# Patient Record
Sex: MALE | Race: BLACK OR AFRICAN AMERICAN
[De-identification: names, ages, dates, MRNs, and addresses within clinical notes are randomized per-mention and may not be internally consistent; named-entity substitution may affect disease eponyms.]

---

## 2017-01-18 ENCOUNTER — HOSPITAL ENCOUNTER (EMERGENCY)
Dept: HOSPITAL 61 - ER | Age: 27
Discharge: HOME | End: 2017-01-18
Payer: SELF-PAY

## 2017-01-18 VITALS — DIASTOLIC BLOOD PRESSURE: 62 MMHG | SYSTOLIC BLOOD PRESSURE: 121 MMHG

## 2017-01-18 VITALS — HEIGHT: 74 IN | BODY MASS INDEX: 26.31 KG/M2 | WEIGHT: 205 LBS

## 2017-01-18 DIAGNOSIS — R05: ICD-10-CM

## 2017-01-18 DIAGNOSIS — F12.10: ICD-10-CM

## 2017-01-18 DIAGNOSIS — M54.6: Primary | ICD-10-CM

## 2017-01-18 LAB
BACTERIA #/AREA URNS HPF: (no result) /HPF
BILIRUB UR QL STRIP: (no result)
GLUCOSE UR STRIP-MCNC: NEGATIVE MG/DL
NITRITE UR QL STRIP: NEGATIVE
PH UR STRIP: 6 [PH]
PROT UR STRIP-MCNC: NEGATIVE MG/DL
RBC #/AREA URNS HPF: (no result) /HPF (ref 0–2)
SP GR UR STRIP: 1.02
SQUAMOUS #/AREA URNS LPF: (no result) /LPF
UROBILINOGEN UR-MCNC: 1 MG/DL
WBC #/AREA URNS HPF: (no result) /HPF (ref 0–4)

## 2017-01-18 PROCEDURE — 81001 URINALYSIS AUTO W/SCOPE: CPT

## 2017-01-18 PROCEDURE — 71020: CPT

## 2017-01-18 NOTE — RAD
Indication left-sided posterior pain for 3 days. Cough.



PA and lateral views of the chest were obtained. Comparison is made to an

examination 11/18/2013.



The heart and pulmonary vessels appear normal. The lungs are clear. There is

no pleural fluid or pneumothorax. There has not been a significant change in

the appearance of the chest compared to the previous exam.



IMPRESSION: No acute or focal process. No significant change

## 2017-01-18 NOTE — PHYS DOC
Past Medical History


Past Medical History:  No Pertinent History


Past Surgical History:  No Surgical History


Alcohol Use:  Occasionally


Drug Use:  Marijuana





Adult General


Chief Complaint


Chief Complaint:  BACK PAIN - NO INJURY





[facility]


HPI


Patient is a 26  year old male presents emergency room Department today with 

complaint of left mid back pain that is atraumatic in nature been ongoing for 

the past 4-5 days. Patient reports he is also been incurring a nonproductive 

cough. He denies shortness of breath. Patient denies any history of 

cardiopulmonary disease. He denies any history of spinal cord or spinal column 

injuries. He denies antibiotic use within the past 30 days. Patient also denies 

any dysuria, hematuria or penile discharge.





Review of Systems


Review of Systems





Constitutional: Denies fever or chills []


Eyes: Denies change in visual acuity, redness, or eye pain []


HENT: Denies nasal congestion or sore throat []


Respiratory: Denies cough or shortness of breath []


Cardiovascular: No additional information not addressed in HPI []


GI: Denies abdominal pain, nausea, vomiting, bloody stools or diarrhea []


: Denies dysuria or hematuria []


Musculoskeletal: Denies back pain or joint pain []


Integument: Denies rash or skin lesions []


Neurologic: Denies headache, focal weakness or sensory changes []


Endocrine: Denies polyuria or polydipsia []





Allergies


Allergies





 Allergies








Coded Allergies Type Severity Reaction Last Updated Verified


 


  No Known Drug Allergies    11/23/15 No











Physical Exam


Physical Exam





Constitutional: Well developed, well nourished, no acute distress, non-toxic 

appearance. []


HENT: Normocephalic, atraumatic, bilateral external ears normal, oropharynx 

moist, no oral exudates, nose normal. []


Eyes: PERRLA, EOMI, conjunctiva normal, no discharge. [] 


Neck: Normal range of motion, no tenderness, supple, no stridor. [] 


Cardiovascular:Heart rate regular rhythm, no murmur []


Lungs & Thorax:  Bilateral breath sounds clear to auscultation 


Abdomen: Bowel sounds normal, soft, no tenderness, no masses, no pulsatile 

masses. [] 


Skin: Warm, dry, no erythema, no rash. [] 


Back: There is no CVA tenderness. There are no lesions overlying the patient's 

left mid back. There is tenderness to palpation to the left paraspinous soft 

tissues radiating laterally without palpable defect or deformity.


Extremities: No tenderness, no cyanosis, no clubbing, ROM intact, no edema. [] 


Neurologic: Alert and oriented X 3, normal motor function, normal sensory 

function, no focal deficits noted. []


Psychologic: Affect normal, judgement normal, mood normal. []





Current Patient Data


Vital Signs





 Vital Signs








  Date Time  Temp Pulse Resp B/P Pulse Ox O2 Delivery O2 Flow Rate FiO2


 


1/18/17 12:44  68 16 121/62 98 Room Air  


 


1/18/17 11:01 98.5       





 98.5       








Lab Values





 Laboratory Tests








Test


  1/18/17


11:39


 


Urine Collection Type Unknown  


 


Urine Color Yellow  


 


Urine Clarity Clear  


 


Urine pH 6.0  


 


Urine Specific Gravity 1.025  


 


Urine Protein


  Negativemg/dL


(NEG-TRACE)


 


Urine Glucose (UA)


  Negativemg/dL


(NEG)


 


Urine Ketones (Stick) 40mg/dL (NEG)  


 


Urine Blood


  Negative (NEG)


 


 


Urine Nitrite


  Negative (NEG)


 


 


Urine Bilirubin Small (NEG)  


 


Urine Urobilinogen Dipstick


  1.0mg/dL (0.2


mg/dL)


 


Urine Leukocyte Esterase


  Negative (NEG)


 


 


Urine RBC


  Rare/HPF (0-2)


 


 


Urine WBC 1-4/HPF (0-4)  


 


Urine Squamous Epithelial


Cells Occ/LPF  


 


 


Urine Bacteria


  Few/HPF


(0-FEW)


 


Urine Mucus Marked/LPF  











EKG


EKG


[]





Radiology/Procedures


Radiology/Procedures


PA and lateral chest x-ray are performed and interpreted by the radiologist. 

There is no evidence of acute intrathoracic process.





Course & Med Decision Making


Course & Med Decision Making


Pertinent Labs and Imaging studies reviewed. (See chart for details)





[]





Dragon Disclaimer


Dragon Disclaimer


This electronic medical record was generated, in whole or in part, using a 

voice recognition dictation system.





Departure


Departure


Impression:  


 Primary Impression:  


 Back pain


Disposition:  01 HOME, SELF-CARE


Condition:  GOOD


Referrals:  


NO PCP (PCP)


Patient Instructions:  Back Pain, Adult, Easy-to-Read





Additional Instructions:


1. Chest x-ray shows no evidence of pneumonia or collapsed lung. Urine shows no 

evidence of bacterial infection.


2. Take the medication as prescribed.


3. Review your discharge instructions for self-care and reasons to return the 

emergency department.


4. A pamphlet has been provided to you for assistance in finding a primary care 

doctor for you to follow up within the next 7-10 days.


Scripts


Orphenadrine Citrate 100 Mg Tablet.er100 Mg PO Q12HR #14 


   Prov:LAURI MAHARAJ         1/18/17


Hydrocodone/Apap 5-325 (Norco 5-325 Tablet)1 Each Tablet1 Tab PO PRN Q6HRS PRN 

PAIN #10 TAB


   Prov:LAURI MAHARAJ         1/18/17





Problem Qualifiers








 Primary Impression:  


 Back pain


 Back pain location:  thoracic back pain  Chronicity:  acute  Back pain 

laterality:  left  Qualified Code:  M54.6 - Pain in thoracic spine





LAURI MAHARAJ Jan 18, 2017 12:35

## 2017-03-21 ENCOUNTER — HOSPITAL ENCOUNTER (EMERGENCY)
Dept: HOSPITAL 61 - ER | Age: 27
Discharge: HOME | End: 2017-03-21
Payer: SELF-PAY

## 2017-03-21 VITALS — WEIGHT: 205 LBS | BODY MASS INDEX: 26.31 KG/M2 | HEIGHT: 74 IN

## 2017-03-21 VITALS — SYSTOLIC BLOOD PRESSURE: 135 MMHG | DIASTOLIC BLOOD PRESSURE: 83 MMHG

## 2017-03-21 DIAGNOSIS — Z11.3: Primary | ICD-10-CM

## 2017-03-21 DIAGNOSIS — F12.10: ICD-10-CM

## 2017-03-21 LAB
BACTERIA #/AREA URNS HPF: 0 /HPF
BILIRUB UR QL STRIP: NEGATIVE
GLUCOSE UR STRIP-MCNC: NEGATIVE MG/DL
NITRITE UR QL STRIP: NEGATIVE
PH UR STRIP: 6 [PH]
PROT UR STRIP-MCNC: NEGATIVE MG/DL
RBC #/AREA URNS HPF: 0 /HPF (ref 0–2)
SP GR UR STRIP: 1.01
SQUAMOUS #/AREA URNS LPF: (no result) /LPF
UROBILINOGEN UR-MCNC: 0.2 MG/DL
WBC #/AREA URNS HPF: 0 /HPF (ref 0–4)

## 2017-03-21 PROCEDURE — 87491 CHLMYD TRACH DNA AMP PROBE: CPT

## 2017-03-21 PROCEDURE — 81001 URINALYSIS AUTO W/SCOPE: CPT

## 2017-03-21 PROCEDURE — 99284 EMERGENCY DEPT VISIT MOD MDM: CPT

## 2017-03-21 PROCEDURE — 87591 N.GONORRHOEAE DNA AMP PROB: CPT

## 2017-03-21 PROCEDURE — 96372 THER/PROPH/DIAG INJ SC/IM: CPT

## 2017-03-21 NOTE — PHYS DOC
Past Medical History


Past Medical History:  No Pertinent History


Past Surgical History:  No Surgical History


Alcohol Use:  Occasionally


Drug Use:  Marijuana





Adult General


Chief Complaint


Chief Complaint:  SEXUALLY TRANSMITTED DISEASE





HPI


HPI


Patient is a 27  year old male who presents with STD concern. Patient states he 

has had dysuria for a couple days, he states he has had unprotected sex and he 

is concerned he could have an STD. He would like to be tested and treated.





Review of Systems


Review of Systems





Constitutional: Denies fever or chills []


: Dysuria and STD concern


Musculoskeletal: Denies back pain or joint pain []


Integument: Denies rash or skin lesions []


Neurologic: Denies headache, focal weakness or sensory changes []


Endocrine: Denies polyuria or polydipsia []





Current Medications


Current Medications








 Current Medications








 Medications


  (Trade)  Dose


 Ordered  Sig/Tia  Start Time


 Stop Time Status Last Admin


Dose Admin


 


 Azithromycin


  (Zithromax)  1,000 mg  1X  ONCE  3/21/17 09:15


 3/21/17 09:16   


 


 


 Ceftriaxone Sodium


  (Rocephin Im)  250 mg  1X  ONCE  3/21/17 09:15


 3/21/17 09:16   


 


 


 Metronidazole


  (Flagyl)  2,000 mg  1X  ONCE  3/21/17 09:15


 3/21/17 09:16   


 














Allergies


Allergies





 Allergies








Coded Allergies Type Severity Reaction Last Updated Verified


 


  No Known Drug Allergies    11/23/15 No











Physical Exam


Physical Exam





Constitutional: Well developed, well nourished, no acute distress, non-toxic 

appearance. []


Abdomen: Bowel sounds normal, soft, no tenderness, no masses, no pulsatile 

masses. [] 


Skin: Warm, dry, no erythema, no rash. [] 


Back: No tenderness, no CVA tenderness. [] 


Extremities: No tenderness, no cyanosis, no clubbing, ROM intact, no edema. [] 


Neurologic: Alert and oriented X 3, normal motor function, normal sensory 

function, no focal deficits noted. []


Psychologic: Affect normal, judgement normal, mood normal. []





EKG


EKG


[]





Radiology/Procedures


Radiology/Procedures


[]





Course & Med Decision Making


Course & Med Decision Making


Pertinent Labs and Imaging studies reviewed. (See chart for details)





Patient is in the ED for STD concern. His urine was sent to lab. He was given 

Flagyl azithromycin and Rocephin. Was provided STD education. Follow-up with 

the health department.





Dragon Disclaimer


Dragon Disclaimer


This electronic medical record was generated, in whole or in part, using a 

voice recognition dictation system.





Departure


Departure


Impression:  


 Primary Impression:  


 Concern about STD in male without diagnosis


Disposition:  01 HOME, SELF-CARE


Condition:  STABLE


Referrals:  


NO PCP (PCP)


Follow-up with your own doctor or the health department in a week if you have 

concerns for STD


Patient Instructions:  Sexually Transmitted Disease





Additional Instructions:


You were seen for STD concern. You were treated in the ED. We did send your 

urine to lab for STDs testing. We will call you if your results are positive. 

Do not have sex for a week. Use protection at all times. Contact all your sex 

partners, let them know you were treated for STDs and ask them to seek 

treatment too.








BLAS FLORES Mar 21, 2017 09:13

## 2018-05-31 ENCOUNTER — HOSPITAL ENCOUNTER (EMERGENCY)
Dept: HOSPITAL 61 - ER | Age: 28
Discharge: HOME | End: 2018-05-31
Payer: SELF-PAY

## 2018-05-31 DIAGNOSIS — J02.9: ICD-10-CM

## 2018-05-31 DIAGNOSIS — F12.10: ICD-10-CM

## 2018-05-31 DIAGNOSIS — G43.909: Primary | ICD-10-CM

## 2018-05-31 PROCEDURE — 99284 EMERGENCY DEPT VISIT MOD MDM: CPT

## 2018-05-31 PROCEDURE — 96372 THER/PROPH/DIAG INJ SC/IM: CPT

## 2018-05-31 RX ADMIN — DEXAMETHASONE SODIUM PHOSPHATE 1 MG: 4 INJECTION, SOLUTION INTRAMUSCULAR; INTRAVENOUS at 14:45

## 2018-05-31 RX ADMIN — LIDOCAINE HYDROCHLORIDE 1 ML: 20 SOLUTION ORAL; TOPICAL at 14:45

## 2018-05-31 RX ADMIN — KETOROLAC TROMETHAMINE 1 MG: 30 INJECTION, SOLUTION INTRAMUSCULAR at 14:45

## 2018-05-31 RX ADMIN — ONDANSETRON 1 MG: 4 TABLET, ORALLY DISINTEGRATING ORAL at 14:45

## 2018-09-06 ENCOUNTER — HOSPITAL ENCOUNTER (EMERGENCY)
Dept: HOSPITAL 61 - ER | Age: 28
Discharge: HOME | End: 2018-09-06
Payer: SELF-PAY

## 2018-09-06 VITALS — DIASTOLIC BLOOD PRESSURE: 73 MMHG | SYSTOLIC BLOOD PRESSURE: 114 MMHG

## 2018-09-06 VITALS — BODY MASS INDEX: 25.03 KG/M2 | HEIGHT: 74 IN | WEIGHT: 195 LBS

## 2018-09-06 DIAGNOSIS — L02.31: Primary | ICD-10-CM

## 2018-09-06 DIAGNOSIS — L02.214: ICD-10-CM

## 2018-09-06 PROCEDURE — 10060 I&D ABSCESS SIMPLE/SINGLE: CPT

## 2018-09-06 PROCEDURE — 99283 EMERGENCY DEPT VISIT LOW MDM: CPT

## 2018-09-06 NOTE — PHYS DOC
Past Medical History


Past Medical History:  No Pertinent History


Past Surgical History:  No Surgical History


Alcohol Use:  None


Drug Use:  None





Adult General


Chief Complaint


Chief Complaint:  ABSCESS





HPI


HPI





Patient is a 28  year old male who presents with a left buttock abscess that 

has been there for 5 days. Patient also has a dime sized red abscess to the 

left groin. Patient denies fever.





Review of Systems


Review of Systems





Constitutional: Denies fever or chills []


Eyes: Denies change in visual acuity, redness, or eye pain []


HENT: Denies nasal congestion or sore throat []


Respiratory: Denies cough or shortness of breath []


Cardiovascular: No additional information not addressed in HPI []


GI: Denies abdominal pain, nausea, vomiting, bloody stools or diarrhea []


: Denies dysuria or hematuria []


Musculoskeletal: Denies back pain or joint pain []


Integument: Denies rash or skin lesions. Left buttock abscess that is large red 

and nondraining. Left groin abscess that is dime sized and nondraining.  []


Neurologic: Denies headache, focal weakness or sensory changes []


Endocrine: Denies polyuria or polydipsia []





All other systems were reviewed and found to be within normal limits, except as 

documented in this note.





Current Medications


Current Medications





Current Medications








 Medications


  (Trade)  Dose


 Ordered  Sig/[facility]  Start Time


 Stop Time Status Last Admin


Dose Admin


 


 Lidocaine/Sodium


 Bicarbonate


  (Buffered


 Lidocaine 1%)  3 ml  1X  ONCE  9/6/18 14:15


 9/6/18 14:16 DC 9/6/18 14:15


3 ML











Allergies


Allergies





Allergies








Coded Allergies Type Severity Reaction Last Updated Verified


 


  No Known Drug Allergies    11/23/15 No











Physical Exam


Physical Exam





Constitutional: Well developed, well nourished, no acute distress, non-toxic 

appearance. []


HENT: Normocephalic, atraumatic, bilateral external ears normal, oropharynx 

moist, no oral exudates, nose normal. []


Eyes: PERRLA, EOMI, conjunctiva normal, no discharge. [] 


Neck: Normal range of motion, no tenderness, supple, no stridor. [] 


Cardiovascular:Heart rate regular rhythm, no murmur []


Lungs & Thorax:  Bilateral breath sounds clear to auscultation []


Abdomen: Bowel sounds normal, soft, no tenderness, no masses, no pulsatile 

masses. [] 


Skin: Warm, dry, no erythema, no rash. [] 


Back: No tenderness, no CVA tenderness. [] 


Extremities: No tenderness, no cyanosis, no clubbing, ROM intact, no edema. [] 


Neurologic: Alert and oriented X 3, normal motor function, normal sensory 

function, no focal deficits noted. []


Psychologic: Affect normal, judgement normal, mood normal. []





Current Patient Data


Vital Signs





 Vital Signs








  Date Time  Temp Pulse Resp B/P (MAP) Pulse Ox O2 Delivery O2 Flow Rate FiO2


 


9/6/18 13:15 98.4 91 18 114/73 (87) 97 Room Air  





 98.4       











EKG


EKG


[]





Radiology/Procedures


Radiology/Procedures


[]





Course & Med Decision Making


Course & Med Decision Making


Patient is a 28  year old male who presents with a left buttock abscess that 

has been there for 5 days. Patient also has a dime sized red abscess to the 

left groin. Patient rates his pain at a 8/10. Patient denies fever. Patient is 

alert and oriented. Patient left buttock abscess is numbed with lidocaine and 

opened with a 11 blade. There was a large amount of foul smelling purulent 

drainage. The wound is packed and the patient should follow up here or with his 

Primary care doctor within 5-7 days. The abscess is packed with iodoform and 

dressed. Patient is sent home with Norco and Clindamycin. 





[]





Dragon Disclaimer


Dragon Disclaimer


This electronic medical record was generated, in whole or in part, using a 

voice recognition dictation system.





Departure


Departure


Impression:  


 Primary Impression:  


 Abscess


Disposition:  01 HOME, SELF-CARE


Condition:  STABLE


Referrals:  


NO PCP (PCP)


Patient Instructions:  Abscess, Abscess, Care After





Additional Instructions:  


Follow up here or with her primary care for further evaluation within 5 days. 

Take medications as prescribed.


Scripts


Hydrocodone/Apap 5-325 (NORCO 5-325 TABLET) 1 Each Tablet


1 TAB PO PRN Q6HRS PRN for PAIN, #6 TAB 0 Refills


   Prov: SURESH LOBATO         9/6/18 


Clindamycin Hcl (CLINDAMYCIN HCL) 300 Mg Capsule


1 CAP PO TID, #21 CAP


   Prov: SURESH LOBATO         9/6/18











SURESH LOBATO Sep 6, 2018 14:13

## 2020-03-06 ENCOUNTER — HOSPITAL ENCOUNTER (EMERGENCY)
Dept: HOSPITAL 61 - ER | Age: 30
Discharge: HOME | End: 2020-03-06
Payer: SELF-PAY

## 2020-03-06 VITALS — BODY MASS INDEX: 27.5 KG/M2 | WEIGHT: 214.29 LBS | HEIGHT: 74 IN

## 2020-03-06 VITALS — DIASTOLIC BLOOD PRESSURE: 69 MMHG | SYSTOLIC BLOOD PRESSURE: 122 MMHG

## 2020-03-06 DIAGNOSIS — F17.200: ICD-10-CM

## 2020-03-06 DIAGNOSIS — Z20.2: Primary | ICD-10-CM

## 2020-03-06 PROCEDURE — 99283 EMERGENCY DEPT VISIT LOW MDM: CPT

## 2020-03-06 PROCEDURE — 96372 THER/PROPH/DIAG INJ SC/IM: CPT

## 2020-03-06 NOTE — PHYS DOC
Past Medical History


Past Medical History:  No Pertinent History


Past Surgical History:  No Surgical History


Smoking Status:  Current Every Day Smoker


Alcohol Use:  None


Drug Use:  None





Adult General


Chief Complaint


Chief Complaint:  SEXUALLY TRANSMITTED DISEASE





HPI


HPI





Patient is a 30  year old male patient who presents to the ED today complaining 

of STD exposure, patient reports that his significant other tested positive for 

chlamydia and he would like to be treated.  Patient denies any symptoms





Review of Systems


Review of Systems





Constitutional: Denies fever or chills []





: Reports STD concern.  Denies dysuria or hematuria []


Musculoskeletal: Denies back pain or joint pain []


Integument: Denies rash or skin lesions []


Neurologic: Denies headache, focal weakness or sensory changes []








All other systems were reviewed and found to be within normal limits, except as 

documented in this note.





Allergies


Allergies





Allergies








Coded Allergies Type Severity Reaction Last Updated Verified


 


  No Known Drug Allergies    11/23/15 No











Physical Exam


Physical Exam





Constitutional: Well developed, well nourished, no acute distress, non-toxic 

appearance. []


Skin: Warm, dry, no erythema, no rash. [] 


Back: No tenderness, no CVA tenderness. [] 


Extremities: No tenderness, no cyanosis, no clubbing, ROM intact, no edema. [] 


Neurologic: Alert and oriented X 3, normal motor function, normal sensory 

function, no focal deficits noted. []


Psychologic: Affect normal, judgement normal, mood normal. []





Current Patient Data


Vital Signs





                                   Vital Signs








  Date Time  Temp Pulse Resp B/P (MAP) Pulse Ox O2 Delivery O2 Flow Rate FiO2


 


3/6/20 12:58 97.9 75 16 122/69 (86) 97 Room Air  





 97.9       











EKG


EKG


[]





Radiology/Procedures


Radiology/Procedures


[]





Course & Med Decision Making


Course & Med Decision Making


Pertinent Labs and Imaging studies reviewed. (See chart for details)





This is a 30-year-old male patient presenting to the ED today with STD concern. 

 Patient was treated.  Education provided.





Dragon Disclaimer


Dragon Disclaimer


This electronic medical record was generated, in whole or in part, using a voice

 recognition dictation system.





Departure


Departure


Impression:  


   Primary Impression:  


   Concern about STD in male without diagnosis


Disposition:  01 HOME, SELF-CARE


Condition:  STABLE


Referrals:  


NO PCP (PCP)


Follow-up with the health department for further STD concerns


Patient Instructions:  Sexually Transmitted Disease, Easy-to-Read





Additional Instructions:  


You were treated for sexually transmitted diseases.  Use protection at all t

imes.  No sex for 1 week.  Follow-up with the health department for further STD 

concerns











BLAS FLORES               Mar 6, 2020 13:06

## 2020-11-16 ENCOUNTER — HOSPITAL ENCOUNTER (EMERGENCY)
Dept: HOSPITAL 61 - ER | Age: 30
Discharge: HOME | End: 2020-11-16
Payer: SELF-PAY

## 2020-11-16 VITALS — DIASTOLIC BLOOD PRESSURE: 77 MMHG | SYSTOLIC BLOOD PRESSURE: 120 MMHG

## 2020-11-16 VITALS — BODY MASS INDEX: 26.14 KG/M2 | HEIGHT: 74 IN | WEIGHT: 203.71 LBS

## 2020-11-16 DIAGNOSIS — F10.10: ICD-10-CM

## 2020-11-16 DIAGNOSIS — G43.909: Primary | ICD-10-CM

## 2020-11-16 DIAGNOSIS — F17.200: ICD-10-CM

## 2020-11-16 PROCEDURE — 96372 THER/PROPH/DIAG INJ SC/IM: CPT

## 2020-11-16 PROCEDURE — 99283 EMERGENCY DEPT VISIT LOW MDM: CPT

## 2020-11-16 NOTE — PHYS DOC
Past Medical History


Past Medical History:  Migraines


Past Surgical History:  No Surgical History


Smoking Status:  Current Every Day Smoker


Additional Information:  


5-6 cigarettes daily


Alcohol Use:  Heavy


Additional Information:  


reports drinking beer every other day


Drug Use:  None





General Adult


EDM:


Chief Complaint:  HEADACHE





HPI:


HPI:





Patient is a 30  year old male who presents with headache. Patient has history 

of migraines since childhood. Does not take daily migraine prevention or 

abortive medications. Migraines normally last 12-24 hours but current one has 

lasted for 5 days. Has tried ibuprofen, tylenol, and BC powder at home with no 

success. Reports photophobia. Pain is a 8/10 and described as 

throbbing/tenderness around left eye.





Review of Systems:


Review of Systems:


Constitutional: Denies fever or chills 


Eyes: Denies redness, reports pain surrounding L eye


HENT: Denies sore throat, reports nasal congestion


Respiratory: Denies cough or shortness of breath 


Cardiovascular: Denies chest pain or palpitations


GI: Denies abdominal pain, nausea, or vomiting


: Denies dysuria or hematuria


Musculoskeletal: Denies back pain or joint pain, denies neck pain


Integument: Denies rash or skin lesions 


Neurologic: Denies headache, focal weakness or sensory changes





Complete systems were reviewed and found to be within normal limits, except as 

documented in this note.





Current Medications:





Current Medications








 Medications


  (Trade)  Dose


 Ordered  Sig/Tia  Start Time


 Stop Time Status Last Admin


Dose Admin


 


 Acetaminophen/


 Butalbital/


 Caffeine


  (Fioricet)  1 tab  1X  ONCE  11/16/20 11:00


 11/16/20 11:01 UNV  





 


 Dexamethasone


  (Decadron)  10 mg  1X  ONCE  11/16/20 11:00


 11/16/20 11:01 UNV  














Allergies:


Allergies:





Allergies








Coded Allergies Type Severity Reaction Last Updated Verified


 


  No Known Drug Allergies    11/16/20 No











Physical Exam:


PE:


Constitutional: Well developed, well nourished, no acute distress, non-toxic 

appearance


HENT: Normocephalic, atraumatic


Eyes: PERRL, EOMI, conjunctiva normal, no discharge, tenderness to palpation 

over L maxillary and frontal sinuses, photophobia


Neck: Normal range of motion, no tenderness, supple, no meningitic signs


Lungs & Thorax:  No respiratory distress, equal chest rise and fall


Abdomen: Soft, no tenderness


Skin: Warm, dry, no erythema, no rash


Back: No tenderness, no CVA tenderness


Extremities: No tenderness, ROM intact, no edema


Neurologic: Alert and oriented X 3, normal motor function, normal sensory 

function, no focal deficits noted


Psychologic: Affect normal, judgment normal





Current Patient Data:


Vital Signs:





                                   Vital Signs








  Date Time  Temp Pulse Resp B/P (MAP) Pulse Ox O2 Delivery O2 Flow Rate FiO2


 


11/16/20 10:10 97.5 66 16 122/90 (101) 98 Room Air  





 97.5       











Course & Med Decision Making:


Course & Med Decision Making


Based on history of chronic migraines and current presentation, highly 

suspicious of migraine. No imaging performed. No labs drawn. Given steroid and 

combination analgesic. Prescribed steroid and analgesic.





Patient stable for discharge with outpatient follow-up with PCP. Discussed 

findings and plan with patient, who acknowledges understanding and agreement.





Dragon Disclaimer:


Dragon Disclaimer:


This electronic medical record was generated, in whole or in part, using a voice

recognition dictation system.





Departure


Departure


Impression:  


   Primary Impression:  


   Headache


   Qualified Codes:  R51.9 - Headache, unspecified


Disposition:  01 DC HOME SELF CARE/HOMELESS


Condition:  STABLE


Referrals:  


NO PCP (PCP)








JAYLYN BAGLEY MD


Patient Instructions:  Headache, FAQs, Migraine Headache, Easy-to-Read


Scripts


Prednisone (PREDNISONE) 20 Mg Tablet


2 TAB PO DAILY, #10 TAB


   Prov: AMMY LEBLANC DO         11/16/20 


Butalb/Acetaminophen/Caffeine ([id number]-[id number]-ESII -40) 1 Each Tablet


1 EACH PO Q6HRS PRN for HEADACHE, #10 TAB


   Prov: AMMY LEBLANC DO         11/16/20











AMMY LEBLANC DO             Nov 16, 2020 11:00

## 2021-06-01 ENCOUNTER — HOSPITAL ENCOUNTER (EMERGENCY)
Dept: HOSPITAL 61 - ER | Age: 31
Discharge: HOME | End: 2021-06-01
Payer: SELF-PAY

## 2021-06-01 VITALS — WEIGHT: 205.03 LBS | HEIGHT: 74 IN | BODY MASS INDEX: 26.31 KG/M2

## 2021-06-01 VITALS — DIASTOLIC BLOOD PRESSURE: 79 MMHG | SYSTOLIC BLOOD PRESSURE: 125 MMHG
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
## 2021-06-01 DIAGNOSIS — Z20.2: Primary | ICD-10-CM

## 2021-06-01 DIAGNOSIS — G43.909: ICD-10-CM

## 2021-06-01 DIAGNOSIS — F10.20: ICD-10-CM

## 2021-06-01 DIAGNOSIS — F17.200: ICD-10-CM

## 2021-06-01 LAB
APTT PPP: YELLOW S
BACTERIA #/AREA URNS HPF: (no result) /HPF
BILIRUB UR QL STRIP: NEGATIVE
FIBRINOGEN PPP-MCNC: CLEAR MG/DL
NITRITE UR QL STRIP: NEGATIVE
PH UR STRIP: 5.5 [PH]
PROT UR STRIP-MCNC: NEGATIVE MG/DL
RBC #/AREA URNS HPF: (no result) /HPF (ref 0–2)
UROBILINOGEN UR-MCNC: 0.2 MG/DL
WBC #/AREA URNS HPF: (no result) /HPF (ref 0–4)

## 2021-06-01 PROCEDURE — 87591 N.GONORRHOEAE DNA AMP PROB: CPT

## 2021-06-01 PROCEDURE — 96372 THER/PROPH/DIAG INJ SC/IM: CPT

## 2021-06-01 PROCEDURE — 81001 URINALYSIS AUTO W/SCOPE: CPT

## 2021-06-01 PROCEDURE — 87491 CHLMYD TRACH DNA AMP PROBE: CPT

## 2021-06-01 PROCEDURE — 87086 URINE CULTURE/COLONY COUNT: CPT

## 2021-06-01 PROCEDURE — 99283 EMERGENCY DEPT VISIT LOW MDM: CPT

## 2021-06-01 NOTE — PHYS DOC
Past Medical History


Past Medical History:  Migraines


Past Surgical History:  No Surgical History


Smoking Status:  Current Every Day Smoker


Alcohol Use:  Heavy


Drug Use:  None





General Adult


EDM:


Chief Complaint:  SEXUALLY TRANSMITTED DISEASE





HPI:


HPI:


31-year-old male with no significant past medical history presents the ED with 

complaints of dysuria and urethral discharge for the past 2 days.  Reports 

female unprotected partner with vaginal intercourse.  History of prior similar 

symptoms with treatment a few years ago.  Has no routine primary physician.  

Denies any associated  rash, pain or swelling.  Does not believe he has ever 

been tested for blood-borne illnesses.





Review of Systems:


Review of Systems:


Constitutional:   Denies fever or chills. []


Eyes:   Denies change in visual acuity. []


HENT:   Denies nasal congestion or sore throat. [] 


Respiratory:   Denies cough or shortness of breath. [] 


Cardiovascular:   Denies chest pain or edema. [] 


GI:   Denies nausea, vomiting, 


:  Denies hematuria or flank pain


Musculoskeletal:   Denies back pain or joint pain. [] 


Integument:   Denies rash or ulcers


Neurologic:   Denies headache, or neck stiffness


Endocrine:   Denies polyuria or polydipsia. [] 


Psychiatric:  Denies depression or anxiety. []





Heart Score:


C/O Chest Pain:  No


Risk Factors:


Risk Factors:  DM, Current or recent (<one month) smoker, HTN, HLP, family histo

ry of CAD, obesity.


Risk Scores:


Score 0 - 3:  2.5% MACE over next 6 weeks - Discharge Home


Score 4 - 6:  20.3% MACE over next 6 weeks - Admit for Clinical Observation


Score 7 - 10:  72.7% MACE over next 6 weeks - Early Invasive Strategies





Allergies:


Allergies:





Allergies








Coded Allergies Type Severity Reaction Last Updated Verified


 


  No Known Drug Allergies    11/16/20 No











Physical Exam:


PE:





Constitutional: Well developed, well nourished, no acute distress, non-toxic 

appearance. 


HENT: Normocephalic, atraumatic,


Eyes: EOMI, conjunctiva normal, no discharge.  


Neck: Normal range of motion,  supple, 


Cardiovascular: S1/2 present, regular rhythm


Lungs & Thorax: Speaking in full sentences, bilateral equal chest rise, no 

tachypnea or increased work of breathing


Abdomen:  soft, no tenderness, 


Skin: Warm, dry, no erythema, no rash. [] 


Back: No tenderness, no CVA tenderness. [] 


Extremities: No tenderness, no cyanosis, 


Neurologic: Alert and oriented X 3,  no focal deficits noted. []


Psychologic: Affect normal, judgement normal, mood normal. []





Current Patient Data:


Labs:





                                Laboratory Tests








Test


 6/1/21


07:44


 


Urine Collection Type Void  


 


Urine Color Yellow  


 


Urine Clarity Clear  


 


Urine pH


 5.5 (<5.0-8.0)





 


Urine Specific Gravity


 >=1.030


(1.000-1.030)


 


Urine Protein


 Negative mg/dL


(NEG-TRACE)


 


Urine Glucose (UA)


 Negative mg/dL


(NEG)


 


Urine Ketones (Stick)


 Negative mg/dL


(NEG)


 


Urine Blood Trace (NEG)  


 


Urine Nitrite


 Negative (NEG)





 


Urine Bilirubin


 Negative (NEG)





 


Urine Urobilinogen Dipstick


 0.2 mg/dL (0.2


mg/dL)


 


Urine Leukocyte Esterase Large (NEG)  


 


Urine RBC


 3-5 /HPF (0-2)





 


Urine WBC


 Tntc /HPF


(0-4)


 


Urine Squamous Epithelial


Cells Few /LPF  





 


Urine Bacteria


 Few /HPF


(0-FEW)


 


Urine Mucus Mod /LPF  








Vital Signs:





                                   Vital Signs








  Date Time  Temp Pulse Resp B/P (MAP) Pulse Ox O2 Delivery O2 Flow Rate FiO2


 


6/1/21 07:44 97.8 88 16 125/79 (94) 98 Room Air  





 97.8       











EKG:


EKG:


[]





Radiology/Procedures:


Radiology/Procedures:


[]





Course & Med Decision Making:


Course & Med Decision Making


Pertinent Labs and Imaging studies reviewed. (See chart for details)





History concerning for STI.  Will cover for Covid and gonorrhea.  Will refer to 

health department or PCP for blood-borne sexually transmitted infection testing.

  Patient well-appearing with no associated rash.  Will discharge home with 

strict ED return precautions were given for rash, urinary retention, fever, 

flulike symptoms, flank pain, nausea or vomiting. Encouraged urgent outpatient 

follow-up with PMD for blood-borne testing.  Life-threatening processes were 

considered but are low suspicion at this time, given history, physical exam and 

ED workup. Pt was educated on all prescription medications and adverse effects. 

 All patient's questions were answered and pt was stable at time of discharge.





Life/limb-threatening differential includes but is not limited to, blunt vs 

penetrating trauma, bladder or urethral injury or infection, penile 

fracture/amputation/contusion, testicular rupture or dislocation, traumatic 

epididymitis, or pelvic injury or fracture.





I spoken with the patient and her caregivers.  I explained the patient's 

condition, diagnoses and treatment plan based on the information available to me

 at this time.  I have answered the patient and her caregiver's questions and 

addressed any concerns.  The patient and her caregivers have a good 

understanding of patient's diagnosis, condition and treatment plan as can be 

expected at this point.  Vital signs have been stable.  Patient's condition is s

table and appropriate for discharge from the emergency department. 





Patient will pursue further outpatient evaluation with primary care physician or

 other designated or consulting physician as outlined in the discharge 

instructions.  The patient and/or caregivers are agreeable to this plan of care 

and follow-up instructions have been explained in detail.  The patient and/or 

caregivers have received these instructions in written form and have expressed 

an understanding of the discharge instructions.  The patient and/or caregivers 

are aware that any significant change of condition or worsening of symptoms 

should prompt immediate return to this or the closest emergency department or 

call to 911.





Jaya Disclaimer:


Dragon Disclaimer:


This electronic medical record was generated, in whole or in part, using a voice

 recognition dictation system.





Departure


Departure


Impression:  


   Primary Impression:  


   Encounter for assessment of STD exposure


Disposition:  01 HOME / SELF CARE / HOMELESS


Condition:  STABLE


Referrals:  


NO PCP (PCP)


follow up with pcp for blood borne illness testing or FOLLOW UP WITH FAMILY M

EDICINE:


[address], [address]


[place] [address]


Phone: [phone]


Patient Instructions:  Safe Sex, Sexuality and Disability





Additional Instructions:  


EMERGENCY DEPARTMENT GENERAL DISCHARGE INSTRUCTIONS





Thank you for coming to [facility] Emergency Department (ED) 

today and 


trusting us with you care.  We trust that you had a positive experience in our 

Emergency 


Department.  If you wish to speak to the department management, you may call the

 Director at 


[phone].





YOUR FOLLOW UP INSTRUCTIONS ARE AS FOLLOWS:





1.  Do you have a private Doctor?  If you do not have a private doctor, please 

ask for a 


resource list of physicians or clinics that may be able to assist you with 

follow up care.





2.  The Emergency Physicain has interpreted your x-rays.  The X-Ray specialist 

will also 


review them.  If there is a change in the findings, you will be notified in 48 

hours when at 


all possible.





3.  A lab test or culture has been done, your results will be reviewed and you 

will be 


notified if you need a change in treatment.





ADDITIONAL INSTRUCTIONS AND INFORMATION:





1.  Your care today has been supervised by a physician who is specially trained 

in emergency 


care.  Many problems require more than one evaluation for a complete diagnosis 

and 


treatment.  We recommend that you schedule your follow up appointment as recomm

ended to 


ensure complete treatment of you illness or injury.  If you are unable to obtain

 follow up 


care and continue to have a problem, or if your condition worsens, we recommend 

that you 


return to the ED.





2.  We are not able to safely determine your condition over the phone nor are we

 able to 


give sound medical advice over the phone.  For these safety reasons, if you call

 for medical 


advice we will ask you to come to the ED for further evaluation.





3.  If you have any questions regarding these discharge instructions please call

 the ED at 


[phone].





SAFETY INFORMATION:





In the interest of safety, wellness, and injury prevention; we encourage you to 

wear your 


sealbelt, if you smoke; quite smoking, and we encourage family to use a 

protective helmet 


for bicycling and other sporting events that present an increased risk for head 

injury.





IF YOUR SYMPTOMS WORSEN OR NEW SYMPTOMS DEVELOP, OR YOU HAVE CONCERNS ABOUT YOUR

 CONDITION; 


OR IF YOUR CONDITION WORSENS WHILE YOU ARE WAITING FOR YOUR FOLLOW UP 

APPOINTMENT; EITHER 


CONTACT YOUR PRIMARY CARE DOCTOR, THE PHYSICIAN WHOSE NAME AND NUMBER YOU WERE 

GIVEN, OR 


RETURN TO THE ED IMMEDIATELY.


Scripts


Doxycycline Hyclate (DOXYCYCLINE HYCLATE) 100 Mg Capsule


1 CAP PO BID for 7 Days, #14 CAP


   Prov: OSCAR CHAN DO         6/1/21











OSCAR CHAN DO                Jun 1, 2021 09:00